# Patient Record
Sex: FEMALE | Race: WHITE
[De-identification: names, ages, dates, MRNs, and addresses within clinical notes are randomized per-mention and may not be internally consistent; named-entity substitution may affect disease eponyms.]

---

## 2020-07-10 ENCOUNTER — HOSPITAL ENCOUNTER (OUTPATIENT)
Dept: HOSPITAL 62 - ER | Age: 30
Setting detail: OBSERVATION
LOS: 1 days | Discharge: HOME | End: 2020-07-11
Attending: SURGERY | Admitting: SURGERY
Payer: COMMERCIAL

## 2020-07-10 DIAGNOSIS — K35.30: Primary | ICD-10-CM

## 2020-07-10 DIAGNOSIS — Z03.818: ICD-10-CM

## 2020-07-10 LAB
ABSOLUTE LYMPHOCYTES# (MANUAL): 0.4 10^3/UL (ref 0.5–4.7)
ABSOLUTE MONOCYTES # (MANUAL): 0.6 10^3/UL (ref 0.1–1.4)
ADD MANUAL DIFF: YES
ALBUMIN SERPL-MCNC: 4 G/DL (ref 3.5–5)
ALP SERPL-CCNC: 52 U/L (ref 38–126)
ANION GAP SERPL CALC-SCNC: 7 MMOL/L (ref 5–19)
APPEARANCE UR: CLEAR
APTT PPP: YELLOW S
AST SERPL-CCNC: 35 U/L (ref 14–36)
BASOPHILS NFR BLD MANUAL: 0 % (ref 0–2)
BILIRUB DIRECT SERPL-MCNC: 0 MG/DL (ref 0–0.4)
BILIRUB SERPL-MCNC: 1 MG/DL (ref 0.2–1.3)
BILIRUB UR QL STRIP: NEGATIVE
BUN SERPL-MCNC: 11 MG/DL (ref 7–20)
CALCIUM: 8.6 MG/DL (ref 8.4–10.2)
CHLORIDE SERPL-SCNC: 108 MMOL/L (ref 98–107)
CO2 SERPL-SCNC: 23 MMOL/L (ref 22–30)
EOSINOPHIL NFR BLD MANUAL: 0 % (ref 0–6)
ERYTHROCYTE [DISTWIDTH] IN BLOOD BY AUTOMATED COUNT: 12.4 % (ref 11.5–14)
GLUCOSE SERPL-MCNC: 107 MG/DL (ref 75–110)
GLUCOSE UR STRIP-MCNC: 50 MG/DL
HCT VFR BLD CALC: 43.1 % (ref 36–47)
HGB BLD-MCNC: 14.8 G/DL (ref 12–15.5)
KETONES UR STRIP-MCNC: 80 MG/DL
MCH RBC QN AUTO: 31.4 PG (ref 27–33.4)
MCHC RBC AUTO-ENTMCNC: 34.3 G/DL (ref 32–36)
MCV RBC AUTO: 92 FL (ref 80–97)
MONOCYTES % (MANUAL): 3 % (ref 3–13)
NEUTS BAND NFR BLD MANUAL: 7 % (ref 3–5)
PH UR STRIP: 8 [PH] (ref 5–9)
PLATELET # BLD: 182 10^3/UL (ref 150–450)
PLATELET COMMENT: ADEQUATE
POTASSIUM SERPL-SCNC: 4 MMOL/L (ref 3.6–5)
PROT SERPL-MCNC: 7 G/DL (ref 6.3–8.2)
PROT UR STRIP-MCNC: 30 MG/DL
RBC # BLD AUTO: 4.7 10^6/UL (ref 3.72–5.28)
RBC MORPH BLD: (no result)
SEGMENTED NEUTROPHILS % (MAN): 88 % (ref 42–78)
SP GR UR STRIP: 1.02
TOTAL CELLS COUNTED BLD: 100
UROBILINOGEN UR-MCNC: NEGATIVE MG/DL (ref ?–2)
VARIANT LYMPHS NFR BLD MANUAL: 2 % (ref 13–45)
WBC # BLD AUTO: 18.4 10^3/UL (ref 4–10.5)

## 2020-07-10 PROCEDURE — 96365 THER/PROPH/DIAG IV INF INIT: CPT

## 2020-07-10 PROCEDURE — 96375 TX/PRO/DX INJ NEW DRUG ADDON: CPT

## 2020-07-10 PROCEDURE — C9803 HOPD COVID-19 SPEC COLLECT: HCPCS

## 2020-07-10 PROCEDURE — 81025 URINE PREGNANCY TEST: CPT

## 2020-07-10 PROCEDURE — 96361 HYDRATE IV INFUSION ADD-ON: CPT

## 2020-07-10 PROCEDURE — 88304 TISSUE EXAM BY PATHOLOGIST: CPT

## 2020-07-10 PROCEDURE — 00840 ANES IPER PX LOWER ABD NOS: CPT

## 2020-07-10 PROCEDURE — 81001 URINALYSIS AUTO W/SCOPE: CPT

## 2020-07-10 PROCEDURE — 36415 COLL VENOUS BLD VENIPUNCTURE: CPT

## 2020-07-10 PROCEDURE — 85025 COMPLETE CBC W/AUTO DIFF WBC: CPT

## 2020-07-10 PROCEDURE — 74177 CT ABD & PELVIS W/CONTRAST: CPT

## 2020-07-10 PROCEDURE — 99140 ANES COMP EMERGENCY COND: CPT

## 2020-07-10 PROCEDURE — 87635 SARS-COV-2 COVID-19 AMP PRB: CPT

## 2020-07-10 PROCEDURE — 80053 COMPREHEN METABOLIC PANEL: CPT

## 2020-07-10 PROCEDURE — 44970 LAPAROSCOPY APPENDECTOMY: CPT

## 2020-07-10 PROCEDURE — 99285 EMERGENCY DEPT VISIT HI MDM: CPT

## 2020-07-10 RX ADMIN — SODIUM CHLORIDE, SODIUM LACTATE, POTASSIUM CHLORIDE, AND CALCIUM CHLORIDE PRN MLS/HR: .6; .31; .03; .02 INJECTION, SOLUTION INTRAVENOUS at 19:40

## 2020-07-10 RX ADMIN — MEPERIDINE HYDROCHLORIDE ONE MG: 25 INJECTION INTRAMUSCULAR; INTRAVENOUS; SUBCUTANEOUS at 18:55

## 2020-07-10 RX ADMIN — MEPERIDINE HYDROCHLORIDE ONE MG: 25 INJECTION INTRAMUSCULAR; INTRAVENOUS; SUBCUTANEOUS at 18:50

## 2020-07-10 NOTE — RADIOLOGY REPORT (SQ)
EXAM DESCRIPTION:  CT ABD/PELVIS WITH IV ONLY



IMAGES COMPLETED DATE/TIME:  7/10/2020 1:53 pm



REASON FOR STUDY:  Abdominal pain, right lower quadrant



COMPARISON:  None.



TECHNIQUE:  CT scan of the abdomen and pelvis performed using helical scanning technique with dynamic
 intravenous contrast injection.  No oral contrast. Images reviewed with lung, soft tissue, and bone 
windows. Reconstructed coronal and sagittal MPR images reviewed. Delayed images for evaluation of the
 urinary system also acquired. All images stored on PACS.

All CT scanners at this facility use dose modulation, iterative reconstruction, and/or weight based d
osing when appropriate to reduce radiation dose to as low as reasonably achievable (ALARA).

[facility]: Dose Right  [facility]: CareDose    [facility]: Dose Right    [facility]: Teradose 4D    [facility]: Smart Technologies



CONTRAST TYPE AND DOSE:  contrast/concentration: Isovue 350.00 mmol/ml; Total Contrast Delivered: 67.
0 ml; Total Saline Delivered: 42.0 ml



RENAL FUNCTION:  BUN 11; creatinine 0.5



RADIATION DOSE:  CT Rad equipment meets quality standard of care and radiation dose reduction techniq
ues were employed. CTDIvol: NaN - NaN mGy. DLP: 0 mGy-cm..



LIMITATIONS:  None.



FINDINGS:  LOWER CHEST: No significant findings. No nodules or infiltrates.

LIVER: Normal size. No masses.  No dilated ducts.

SPLEEN: Normal size. No focal lesions.

PANCREAS: No masses. No significant calcifications. No adjacent inflammation or peripancreatic fluid 
collections. Pancreatic duct not dilated.

GALLBLADDER: No identified stones by CT criteria. No inflammatory changes to suggest cholecystitis.

ADRENAL GLANDS: No significant masses or asymmetry.

RIGHT KIDNEY AND URETER: No solid masses.   No significant calcifications.   No hydronephrosis or hyd
roureter.

LEFT KIDNEY AND URETER: No solid masses.   No significant calcifications.   No hydronephrosis or hydr
oureter.

AORTA AND VESSELS: No aneurysm. No dissection. Renal arteries, SMA, celiac without stenosis.

RETROPERITONEUM: No retroperitoneal adenopathy, hemorrhage or masses.

BOWEL AND PERITONEAL CAVITY: No inflammatory changes or obstruction.  Fluid is seen within multiple l
oops of small bowel.

APPENDIX: The appendix appears fluid filled and mildly prominent, measuring up to 11 mm in greatest o
rthogonal dimension.  This structure is positioned deep within the pelvis adjacent to the right adnex
a.

PELVIS: Simple appearing free fluid is seen within the pelvic cul-de-sac.  The uterus and adnexa appe
ar grossly normal.  The bladder is unremarkable.

ABDOMINAL WALL: No masses. No hernias.

BONES: No significant or acute findings.

OTHER: No other significant finding.



IMPRESSION:  The appendix appears mildly prominent and fluid-filled.  There is a paucity of intra-abd
ominal fat, limiting evaluation for associated inflammatory changes.  Otherwise unremarkable CT appea
nelsy of the abdomen and pelvis.



TECHNICAL DOCUMENTATION:  JOB ID:  [id number]

Quality ID # [id number]: Final reports with documentation of one or more dose reduction techniques (e.g., Au
tomated exposure control, adjustment of the mA and/or kV according to patient size, use of iterative 
reconstruction technique)

 2011 [facility]- All Rights Reserved



Reading location - IP/workstation name: [email or web address]

## 2020-07-10 NOTE — OPERATIVE REPORT
Operative Report


DATE OF SURGERY: 07/10/20


PREOPERATIVE DIAGNOSIS: Acute appendicitis


POSTOPERATIVE DIAGNOSIS: Same; no evidence of rupture


OPERATION: Laparoscopic appendectomy


SURGEON: LEANNA NORTON


ANESTHESIA: GA


TISSUE REMOVED OR ALTERED: 1 appendix


COMPLICATIONS: 





None


ESTIMATED BLOOD LOSS: 20 cc


INTRAOPERATIVE FINDINGS: See below


PROCEDURE: 





Patient was taken to the preop holding area, after voiding, to the main 

operating room where general anesthesia was induced.  Arms were tucked, abdomen 

exposed, prepped and draped in a sterile fashion with Betadine.





Surgical plan surgical timeout were conducted.





Markings were made on the skin for 3 port laparoscopy, above the umbilicus, in 

the suprapubic area and in the left lower quadrant.  Skin was anesthetized 1% 

plain lidocaine.  A supraumbilical vertical incision was made with the knife, 

Veress needle inserted into the peritoneal cavity, pneumoperitoneum was 

established.  Veress needle was removed, and a 5 mm port was inserted and a 

flexible 5 mm scope was inserted.  There was no evidence of vascular or visceral

injury.





Under direct visualization 2 additional ports were placed one 5 mm the 

suprapubic position, and a 12 mm in the left lower quadrant all inserted under 

direct visualization without difficulty.





Findings were significant for a freely suspended appendix, acutely inflamed 

mildly separative but without perforation.  The terminal ileum, and colon 

appeared normal otherwise.  There was no significant fluid in the pelvis.  The 

uterus and right tube and ovary appeared normal.





We brought onto the field a green load [place] 45 mm autostapler.  An opening 

was made in the mesoappendix adjacent to the shoulder of the proximal appendix 

adjacent to the cecum.  Initially we fired the stapler in the usual fashion but 

it did not deploy properly.  Therefore it was scrapped and a replacement stapler

was brought onto the field.  This time the stapler fired satisfactorily and 

delivered a nice parallel line of staples across the appendiceal stump.  We now 

use the same stapler with a reload and divided the mesoappendix, keeping the 

terminal ileum and cecum out of harm's way.  The appendix was now freed up, 

placed in an Endobag and brought out of the patient without difficulty.





Inspection of the mesoappendix staple line revealed brisk arterial bleeding.  

This was responsible for some of the 20 cc of blood loss.  The bleeding was 

managed with point electrocautery application x1, and 2 hemoclips again along th

e staple line securing the mesoappendix.  The staples across the appendiceal 

stump were intact.





We irrigated the peritoneal cavity out particularly of the below that 

accumulated around the right paracolic gutter and right lobe of the liver.  Once

this was accomplished we reinspected the staple lines previously described and 

they were not bleeding.





We reinspected the pelvis and there was no pathology here.  Sponge and needle 

counts are correct.  All ports removed under direct visualization, 

pneumoperitoneum evacuated, and wounds closed with 0 Vicryl 3-0 Vicryl benzoin 

and Steri-Strips.





Patient tolerated the procedure well, extubated, taken to the recovery room  in 

stable condition.

## 2020-07-10 NOTE — ER DOCUMENT REPORT
ED General





- General


Stated Complaint: NAUSEA/VOMITING


Time Seen by Provider: 07/10/20 09:34


Mode of Arrival: Medic


Information source: Patient


Notes: 





This 29-year-old woman presents to the emergency department via EMS with a 

complaint of nausea and vomiting, abdominal pain with associated diarrhea.  She 

states that she ate steak and zucchini from a local restaurant last night.  

Began having symptoms in the early morning about 5 AM and has continued to feel 

dizzy lightheaded and nauseated.  She complains of pain in the lower abdominal 

region, presently stating she does not need anything for pain.  She denies fev

er, cough, body aches and pains or contact with coronavirus known patient.





- Related Data


Allergies/Adverse Reactions: 


                                        





No Known Allergies Allergy (Verified 07/10/20 11:30)


   











Past Medical History





- Social History


Smoking Status: Unknown if Ever Smoked


Family History: Reviewed & Not Pertinent





Review of Systems





- Review of Systems


Notes: 





Constitutional: Negative for fever.


HENT: Negative for sore throat.


Eyes: Negative for visual changes.


Cardiovascular: Negative for chest pain.


Respiratory: Negative for shortness of breath.


Gastrointestinal: + Abdominal pain, +vomiting ,+ diarrhea.


Genitourinary: Negative for dysuria.


Musculoskeletal: Negative for back pain.


Skin: Negative for rash.


Neurological: Negative for headaches, weakness or numbness.





10 point ROS negative except as marked above and in HPI.





Physical Exam





- Vital signs


Vitals: 


                                        











Temp Pulse Resp BP Pulse Ox


 


 97.7 F   93   20   115/75   100 


 


 07/10/20 07:30  07/10/20 07:30  07/10/20 07:30  07/10/20 07:30  07/10/20 07:30














- Notes


Notes: 





PHYSICAL EXAMINATION:


 


Physical Exam:


General: Well-nourished well-developed 29-year-old female in no acute distress


HEENT: NC/AT, pupils equal round and reactive to light, MM moist,nares clear, 

oropharynx clear, airway patent


Neck: supple, no adenopathy, no masses.  Good range of motion


Lungs: clear, no wheezing, no rales no rhonchi


CVS: Regular rate and rhythm no murmur gallop or rub


Abdomen: Soft, active, right lower quadrant tenderness at McBurney's point, + 

rebound, no masses, no hepatosplenomegaly


Ext:   No edema, clubbing or cyanosis.


Neuro: Alert and responsive, moving all 4 extremities on command, cranial nerves

intact, no focal findings


Skin: Intact no open lesions, no rash


PSYCH: Normal mood, normal affect.


 








Course





- Re-evaluation


Re-evalutation: 





07/10/20 14:39


I have discussed the findings of the CT scan with the patient.  Apparently noted

by radiology to have appendix which appears prominent and fluid-filled.  Very 

little intra-abdominal fat limits evaluation for associated inflammatory change.

 Otherwise CT of the abdomen pelvis is normal.  I have explained to the patient 

I will have a surgical consultation to evaluate her for possible appendectomy.  

Patient notes that she needs something else for pain.


07/10/20 14:43


I discussed the patient with a surgical nurse on call Dr. Marsh they will see

the patient in the emergency department.  A rapid coronavirus test is ordered 

and patient is started on the antibiotics and fluids.





- Vital Signs


Vital signs: 


                                        











Temp Pulse Resp BP Pulse Ox


 


 98.0 F   89   18   99/54 L  98 


 


 07/11/20 03:29  07/11/20 03:29  07/11/20 03:29  07/11/20 03:29  07/11/20 03:29














- Laboratory


Result Diagrams: 


                                 07/10/20 10:50





                                 07/10/20 10:50


Laboratory results interpreted by me: 


                                        











  07/10/20 07/10/20 07/10/20





  10:50 10:50 10:50


 


WBC  18.4 H  


 


Seg Neuts % (Manual)  88 H  


 


Band Neutrophils %  7 H  


 


Lymphocytes % (Manual)  2 L  


 


Abs Neuts (Manual)  17.5 H  


 


Abs Lymphs (Manual)  0.4 L  


 


Chloride   108 H 


 


Urine Protein    30 H


 


Urine Glucose (UA)    50 H


 


Urine Ketones    80 H














- Diagnostic Test


Radiology reviewed: Image reviewed, Reports reviewed





Discharge





- Discharge


Clinical Impression: 


 Right lower quadrant abdominal pain





Acute appendicitis


Qualifiers:


 Acute appendicitis type: unspecified acute appendicitis type Qualified Code(s):

K35.80 - Unspecified acute appendicitis





Leukocytosis


Qualifiers:


 Leukocytosis type: other Qualified Code(s): D72.828 - Other elevated white 

blood cell count





Condition: Good


Disposition: ADMITTED AS INPATIENT


Admitting Provider: Surgicalist - Dr. Marsh


Unit Admitted: Surgical Floor

## 2020-07-11 VITALS — SYSTOLIC BLOOD PRESSURE: 109 MMHG | DIASTOLIC BLOOD PRESSURE: 70 MMHG

## 2020-07-11 RX ADMIN — SODIUM CHLORIDE, SODIUM LACTATE, POTASSIUM CHLORIDE, AND CALCIUM CHLORIDE PRN MLS/HR: .6; .31; .03; .02 INJECTION, SOLUTION INTRAVENOUS at 06:07

## 2020-07-11 NOTE — PDOC PROGRESS REPORT
Subjective


Progress Note for:: 07/11/20


Subjective:: 





Feels well.  Some mild abdominal discomfort.  Tolerating a diet well.


Reason For Visit: 


ACUTE APPENDICITIS








Physical Exam


Vital Signs: 


                                        











Temp Pulse Resp BP Pulse Ox


 


 98.0 F   89   18   99/54 L  98 


 


 07/11/20 03:29  07/11/20 03:29  07/11/20 03:29  07/11/20 03:29  07/11/20 03:29








                                 Intake & Output











 07/10/20 07/11/20 07/12/20





 06:59 06:59 06:59


 


Intake Total  6238 


 


Output Total  1020 


 


Balance  5218 


 


Weight  49.1 kg 











General appearance: PRESENT: no acute distress, cooperative


Respiratory exam: PRESENT: clear to auscultation patience


Cardiovascular exam: PRESENT: RRR


GI/Abdominal exam: PRESENT: other - Soft, nondistended, minimal tenderness.  

Wounds are clean with Steri-Strips.





Results


Laboratory Results: 


                                        





                                 07/10/20 10:50 





                                 07/10/20 10:50 





                                        











  07/10/20 07/10/20 07/10/20





  10:50 10:50 10:50


 


WBC  18.4 H  


 


RBC  4.70  


 


Hgb  14.8  


 


Hct  43.1  


 


MCV  92  


 


MCH  31.4  


 


MCHC  34.3  


 


RDW  12.4  


 


Plt Count  182  


 


Seg Neutrophils %  Not Reportable  


 


Sodium   137.5 


 


Potassium   4.0 


 


Chloride   108 H 


 


Carbon Dioxide   23 


 


Anion Gap   7 


 


BUN   11 


 


Creatinine   0.55 


 


Est GFR ( Amer)   > 60 


 


Glucose   107 


 


Calcium   8.6 


 


Total Bilirubin   1.0 


 


AST   35 


 


Alkaline Phosphatase   52 


 


Total Protein   7.0 


 


Albumin   4.0 


 


Urine Color    YELLOW


 


Urine Appearance    CLEAR


 


Urine pH    8.0


 


Ur Specific Gravity    1.018


 


Urine Protein    30 H


 


Urine Glucose (UA)    50 H


 


Urine Ketones    80 H


 


Urine Blood    NEGATIVE


 


Urine RBC (Auto)    2











Impressions: 


                                        





Abdomen/Pelvis CT  07/10/20 12:10


IMPRESSION:  The appendix appears mildly prominent and fluid-filled.  There is a

paucity of intra-abdominal fat, limiting evaluation for associated inflammatory 

changes.  Otherwise unremarkable CT appearance of the abdomen and pelvis.


 














Assessment & Plan





- Diagnosis


(1) Acute appendicitis


Qualifiers: 


   Acute appendicitis type: unspecified acute appendicitis type   Qualified 

Code(s): K35.80 - Unspecified acute appendicitis   


Is this a current diagnosis for this admission?: Yes   


Plan: 


Doing well status post laparoscopic appendectomy.  Will discharge patient home.

## 2020-07-11 NOTE — PDOC DISCHARGE SUMMARY
General





- Admit/Disc Date/PCP


Admission Date/Primary Care Provider: 


  07/10/20 15:44





  





Discharge Date: 07/11/20





- Discharge Diagnosis


Final Diagnosis: 





Appendicitis





- Assessment


Summary: 


Patient underwent laparoscopic appendectomy.  Patient did well postoperatively. 

Her exam looks good and she was tolerating a diet well at the time of discharge.

 Patient is now been discharged to home in good condition.  She is encouraged to

stay active at home but avoid strenuous activity.  She may shower tomorrow.  She

may resume her home diet.  She is to stay out of work for the next week.  She is

to follow-up at [place] [facility] with Dr. Marsh in 1 to 2 weeks.  She 

may take Tylenol over-the-counter as needed for pain.  She is to call for any 

problems such as fever, purulent drainage, wound redness, vomiting, 

progressively worsening pain.  She is to avoid driving for the next several days

until her mind is clear and she is not distracted by abdominal discomfort.





- Additional Information


Resuscitation Status: Full Code


Discharge Diet: As Tolerated


Discharge Activity: Activity As Tolerated - Stay active but avoid strenuous 

activity.


Referrals: 


LEANNA MARSH MD [ACTIVE STAFF] - 





History of Present Illiness


History of Present Illness: 


SPIKE HARLEY is a 29 year old female








Physical Exam


Vital Signs: 


                                        











Temp Pulse Resp BP Pulse Ox


 


 98.0 F   89   18   99/54 L  98 


 


 07/11/20 03:29  07/11/20 03:29  07/11/20 03:29  07/11/20 03:29  07/11/20 03:29








                                 Intake & Output











 07/10/20 07/11/20 07/12/20





 06:59 06:59 06:59


 


Intake Total  6238 


 


Output Total  1020 


 


Balance  5218 


 


Weight  49.1 kg 














Results


Laboratory Results: 


                                        











WBC  18.4 10^3/uL (4.0-10.5)  H  07/10/20  10:50    


 


RBC  4.70 10^6/uL (3.72-5.28)   07/10/20  10:50    


 


Hgb  14.8 g/dL (12.0-15.5)   07/10/20  10:50    


 


Hct  43.1 % (36.0-47.0)   07/10/20  10:50    


 


MCV  92 fl (80-97)   07/10/20  10:50    


 


MCH  31.4 pg (27.0-33.4)   07/10/20  10:50    


 


MCHC  34.3 g/dL (32.0-36.0)   07/10/20  10:50    


 


RDW  12.4 % (11.5-14.0)   07/10/20  10:50    


 


Plt Count  182 10^3/uL (150-450)   07/10/20  10:50    


 


Lymph % (Auto)  Not Reportable   07/10/20  10:50    


 


Mono % (Auto)  Not Reportable   07/10/20  10:50    


 


Eos % (Auto)  Not Reportable   07/10/20  10:50    


 


Baso % (Auto)  Not Reportable   07/10/20  10:50    


 


Absolute Neuts (auto)  Not Reportable   07/10/20  10:50    


 


Absolute Lymphs (auto)  Not Reportable   07/10/20  10:50    


 


Absolute Monos (auto)  Not Reportable   07/10/20  10:50    


 


Absolute Eos (auto)  Not Reportable   07/10/20  10:50    


 


Absolute Basos (auto)  Not Reportable   07/10/20  10:50    


 


Total Counted  100   07/10/20  10:50    


 


Seg Neutrophils %  Not Reportable   07/10/20  10:50    


 


Seg Neuts % (Manual)  88 % (42-78)  H  07/10/20  10:50    


 


Band Neutrophils %  7 % (3-5)  H  07/10/20  10:50    


 


Lymphocytes % (Manual)  2 % (13-45)  L  07/10/20  10:50    


 


Monocytes % (Manual)  3 % (3-13)   07/10/20  10:50    


 


Eosinophils % (Manual)  0 % (0-6)   07/10/20  10:50    


 


Basophils % (Manual)  0 % (0-2)   07/10/20  10:50    


 


Abs Neuts (Manual)  17.5 10^3/uL (1.7-8.2)  H  07/10/20  10:50    


 


Abs Lymphs (Manual)  0.4 10^3/uL (0.5-4.7)  L  07/10/20  10:50    


 


Abs Monocytes (Manual)  0.6 10^3/uL (0.1-1.4)   07/10/20  10:50    


 


Absolute Eos (Manual)  0.0 10^3/uL (0.0-0.6)   07/10/20  10:50    


 


Abs Basophils (Manual)  0.0 10^3/uL (0.0-0.2)   07/10/20  10:50    


 


Platelet Comment  ADEQUATE   07/10/20  10:50    


 


RBC Morph Comment  NORMO-CYTIC/CHROMIC   07/10/20  10:50    


 


Sodium  137.5 mmol/L (137-145)   07/10/20  10:50    


 


Potassium  4.0 mmol/L (3.6-5.0)   07/10/20  10:50    


 


Chloride  108 mmol/L ()  H  07/10/20  10:50    


 


Carbon Dioxide  23 mmol/L (22-30)   07/10/20  10:50    


 


Anion Gap  7  (5-19)   07/10/20  10:50    


 


BUN  11 mg/dL (7-20)   07/10/20  10:50    


 


Creatinine  0.55 mg/dL (0.52-1.25)   07/10/20  10:50    


 


Est GFR ( Amer)  > 60  (>60)   07/10/20  10:50    


 


Est GFR (MDRD) Non-Af  > 60  (>60)   07/10/20  10:50    


 


Glucose  107 mg/dL ()   07/10/20  10:50    


 


Calcium  8.6 mg/dL (8.4-10.2)   07/10/20  10:50    


 


Total Bilirubin  1.0 mg/dL (0.2-1.3)   07/10/20  10:50    


 


Direct Bilirubin  0.0 mg/dL (0.0-0.4)   07/10/20  10:50    


 


Neonat Total Bilirubin  Not Reportable   07/10/20  10:50    


 


Neonat Direct Bilirubin  Not Reportable   07/10/20  10:50    


 


Neonat Indirect Bili  Not Reportable   07/10/20  10:50    


 


AST  35 U/L (14-36)   07/10/20  10:50    


 


ALT  16 U/L (<35)   07/10/20  10:50    


 


Alkaline Phosphatase  52 U/L ()   07/10/20  10:50    


 


Total Protein  7.0 g/dL (6.3-8.2)   07/10/20  10:50    


 


Albumin  4.0 g/dL (3.5-5.0)   07/10/20  10:50    


 


Urine Color  YELLOW   07/10/20  10:50    


 


Urine Appearance  CLEAR   07/10/20  10:50    


 


Urine pH  8.0  (5.0-9.0)   07/10/20  10:50    


 


Ur Specific Gravity  1.018   07/10/20  10:50    


 


Urine Protein  30 mg/dL (NEGATIVE)  H  07/10/20  10:50    


 


Urine Glucose (UA)  50 mg/dL (NEGATIVE)  H  07/10/20  10:50    


 


Urine Ketones  80 mg/dL (NEGATIVE)  H  07/10/20  10:50    


 


Urine Blood  NEGATIVE  (NEGATIVE)   07/10/20  10:50    


 


Urine Nitrite (Reflex)  NEGATIVE  (NEGATIVE)   07/10/20  10:50    


 


Urine Bilirubin  NEGATIVE  (NEGATIVE)   07/10/20  10:50    


 


Urine Urobilinogen  NEGATIVE mg/dL (<2.0)   07/10/20  10:50    


 


Leukocyte Esterase Rfl  NEGATIVE  (NEGATIVE)   07/10/20  10:50    


 


Urine RBC (Auto)  2 /HPF  07/10/20  10:50    


 


Urine WBC (Reflex)  2 /HPF  07/10/20  10:50    


 


Urine Mucus (Auto)  RARE /LPF  07/10/20  10:50    


 


Urine Ascorbic Acid  NEGATIVE  (NEGATIVE)   07/10/20  10:50    


 


Urine HCG, Qual  NEGATIVE  (NEGATIVE)   07/10/20  10:50    


 


COVID-19 Source  Cancelled   07/10/20  12:30    


 


COVID-19 ([place])  Cancelled   07/10/20  12:30    


 


SARS-CoV-2 (PCR)  NEGATIVE  (NEGATIVE)   07/10/20  12:30    











Impressions: 


                                        





Abdomen/Pelvis CT  07/10/20 12:10


IMPRESSION:  The appendix appears mildly prominent and fluid-filled.  There is a

paucity of intra-abdominal fat, limiting evaluation for associated inflammatory 

changes.  Otherwise unremarkable CT appearance of the abdomen and pelvis.